# Patient Record
Sex: MALE | Race: OTHER | Employment: FULL TIME | URBAN - METROPOLITAN AREA
[De-identification: names, ages, dates, MRNs, and addresses within clinical notes are randomized per-mention and may not be internally consistent; named-entity substitution may affect disease eponyms.]

---

## 2021-01-18 ENCOUNTER — TRANSCRIBE ORDER (OUTPATIENT)
Dept: SCHEDULING | Age: 36
End: 2021-01-18

## 2021-01-18 DIAGNOSIS — S90.859A FOREIGN BODY IN FOOT: Primary | ICD-10-CM

## 2022-09-15 ENCOUNTER — OFFICE VISIT (OUTPATIENT)
Dept: ORTHOPEDIC SURGERY | Age: 37
End: 2022-09-15
Payer: COMMERCIAL

## 2022-09-15 VITALS — TEMPERATURE: 96.9 F | HEIGHT: 75 IN | WEIGHT: 228.2 LBS | BODY MASS INDEX: 28.37 KG/M2

## 2022-09-15 DIAGNOSIS — M77.12 LEFT LATERAL EPICONDYLITIS: Primary | ICD-10-CM

## 2022-09-15 DIAGNOSIS — M25.522 LEFT ELBOW PAIN: ICD-10-CM

## 2022-09-15 PROCEDURE — 20550 NJX 1 TENDON SHEATH/LIGAMENT: CPT | Performed by: SPECIALIST

## 2022-09-15 PROCEDURE — 99203 OFFICE O/P NEW LOW 30 MIN: CPT | Performed by: SPECIALIST

## 2022-09-15 RX ORDER — BETAMETHASONE SODIUM PHOSPHATE AND BETAMETHASONE ACETATE 3; 3 MG/ML; MG/ML
3 INJECTION, SUSPENSION INTRA-ARTICULAR; INTRALESIONAL; INTRAMUSCULAR; SOFT TISSUE ONCE
Status: COMPLETED | OUTPATIENT
Start: 2022-09-15 | End: 2022-09-15

## 2022-09-15 RX ADMIN — BETAMETHASONE SODIUM PHOSPHATE AND BETAMETHASONE ACETATE 3 MG: 3; 3 INJECTION, SUSPENSION INTRA-ARTICULAR; INTRALESIONAL; INTRAMUSCULAR; SOFT TISSUE at 13:59

## 2022-09-15 NOTE — PROGRESS NOTES
Patient: Lauryn Nichols                MRN: [id number]       SSN: [id number]  YOB: 1985        AGE: 30 y.o. SEX: male    PCP: None  09/15/22    CC: LEFT ELBOW PAIN    HISTORY:  Lauryn Nichols is a 30 y.o. male who is seen for left elbow pain. He has been experiencing posterolateral left elbow pain for the past couple of months. 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 Someone kicked his arm and he felt his elbow pop. He thought nothing of it until he was getting a tattoo and the artist pressed on his elbow. He is s/p two incision left distal biceps tendon repair preformed by Dr. Gretta Ambrosio on 3/24/14, left shoulder arthroscopy with repair of superior labral tear from anterior to posterior preformed by Dr. Danielle Quiroz on 7/25/17 & right shoulder arthroscopy with capsulorraphy preformed by Dr. Danielle Quiroz on 10/30/18. Occupation, etc:  Mr. 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 He went to [employer] and received a [employer] sciences degree. 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 He lives in [facility] with his dog (exo bully) named Bernie Freeman. He has has one son who is 9 and is also in the area. Mr. Jonah Combs weighs 220 lbs and is 6'3\" tall. No results found for: HBA1C, PDF3EJZG, FDY4EUNU, HLT1TTOL  Weight Metrics 9/15/2022 3/15/2014 7/1/2013   Weight 228 lb 3.2 oz 220 lb 225 lb   BMI 28.52 kg/m2 27.5 kg/m2 28.12 kg/m2       There is no problem list on file for this patient.     REVIEW OF SYSTEMS:    Constitutional Symptoms: Negative   Eyes: Negative   Ears, Nose, Throat and Mouth: Negative   Cardiovascular: Negative   Respiratory: Negative   Genitourinary: Per HPI   Gastrointestinal: Per HPI   Integumentary (Skin and/or Breast): Negative   Musculoskeletal: Per HPI   Endocrine/Rheumatologic: Negative   Neurological: Per HPI   Hematology/Lymphatic: Negative    Allergic/Immunologic: Negative   Phychiatric: Negative    Social History     Socioeconomic History    Marital status: SINGLE     Spouse name: Not on file    Number of children: Not on file    Years of education: Not on file    Highest education level: Not on file   Occupational History    Not on file   Tobacco Use    Smoking status: Never    Smokeless tobacco: Never   Substance and Sexual Activity    Alcohol use: Yes    Drug use: No    Sexual activity: Not on file   Other Topics Concern    Not on file   Social History Narrative    Not on file     Social Determinants of Health     Financial Resource Strain: Not on file   Food Insecurity: Not on file   Transportation Needs: Not on file   Physical Activity: Not on file   Stress: Not on file   Social Connections: Not on file   Intimate Partner Violence: Not on file   Housing Stability: Not on file      No Known Allergies   Current Outpatient Medications   Medication Sig    oxyCODONE-acetaminophen (PERCOCET) 5-325 mg per tablet Take 1 Tab by mouth every four (4) hours as needed (BREAKTHROUGH PAIN ONLY). No current facility-administered medications for this visit.       PHYSICAL EXAMINATION:  Visit Vitals  Temp 96.9 °F (36.1 °C) (Temporal)   Ht 6' 3\" (1.905 m)   Wt 228 lb 3.2 oz (103.5 kg)   BMI 28.52 kg/m²      ORTHO EXAMINATION:  Examination Right Elbow Left Elbow   Skin Intact Intact   Range of Motion 135-0 130-10   Tenderness - -   Swelling - -   Bruising - -   Stability Normal Normal   Motor Strength  Normal Normal   Neurovascular Intact Intact   Two well healed incisions from distal biceps repair       TIME OUT:  Chart reviewed for the following:   Alvino Hilliard MD, have reviewed the History, Physical and updated the Allergic reactions for [address] performed immediately prior to start of procedure:  Alvino Hilliard MD, have performed the following reviews on [facility] prior to the start of the procedure:          * Patient was identified by name and date of birth   * Agreement on procedure being performed was verified  * Risks and Benefits explained to the patient  * Procedure site verified and marked as necessary  * Patient was positioned for comfort  * Consent was obtained     Time: 1:48 PM     Date of procedure: 9/15/2022  Procedure performed by:  Rosamaria Patrick MD  Mr. Cody Harris tolerated the procedure well with no complications. IMPRESSION:     ICD-10-CM ICD-9-CM    1. Left lateral epicondylitis  M77.12 726.32 betamethasone (CELESTONE) injection 3 mg      INJECT TENDON SHEATH/LIGAMENT      REFERRAL TO ORTHOPEDIC SURGERY      AMB SUPPLY ORDER      2. Left elbow pain  M25.522 719.42 betamethasone (CELESTONE) injection 3 mg      INJECT TENDON SHEATH/LIGAMENT      REFERRAL TO ORTHOPEDIC SURGERY      AMB SUPPLY ORDER        PLAN:  After discussing treatment options, patient's left elbow was injected with 4 cc Marcaine and 1/2 cc Celestone. A tennis elbow strap was provided. There is no need for surgery at this time. F/U with Dr. Julieta DURAN.     Scribed by Krista Yepez ([address]) as dictated by Rosamaria Patrick MD

## 2022-10-19 ENCOUNTER — OFFICE VISIT (OUTPATIENT)
Dept: ORTHOPEDIC SURGERY | Age: 37
End: 2022-10-19
Payer: COMMERCIAL

## 2022-10-19 VITALS
BODY MASS INDEX: 28.1 KG/M2 | WEIGHT: 226 LBS | OXYGEN SATURATION: 96 % | HEART RATE: 83 BPM | HEIGHT: 75 IN | RESPIRATION RATE: 16 BRPM

## 2022-10-19 DIAGNOSIS — S53.442A ELBOW SPRAIN, ULNAR COLLATERAL LIGAMENT, LEFT, INITIAL ENCOUNTER: ICD-10-CM

## 2022-10-19 DIAGNOSIS — M77.12 LEFT LATERAL EPICONDYLITIS: ICD-10-CM

## 2022-10-19 DIAGNOSIS — M25.522 LEFT ELBOW PAIN: Primary | ICD-10-CM

## 2022-10-19 PROCEDURE — 73080 X-RAY EXAM OF ELBOW: CPT | Performed by: ORTHOPAEDIC SURGERY

## 2022-10-19 PROCEDURE — 99214 OFFICE O/P EST MOD 30 MIN: CPT | Performed by: ORTHOPAEDIC SURGERY

## 2022-10-19 NOTE — PROGRESS NOTES
Patient: Jackquelyn Barthel                MRN: [id number]       SSN: [id number]  YOB: 1985        AGE: 30 y.o. SEX: male  Body mass index is 28.25 kg/m². PCP: None  10/19/22    CHIEF COMPLAINT: Left elbow pain    HPI: Jackquelyn Barthel is a 30 y.o. male patient who injured his left elbow several months ago. He has had a previous left elbow distal biceps rupture that was repaired in 2014. 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 He was seen by Dr. Miguel Campos who gave him a corticosteroid injection which did not give him much relief. He complains of pain over the lateral aspect of his elbow as well as a feeling of catching and as well as stiffness. History reviewed. No pertinent past medical history. History reviewed. No pertinent family history. Current Outpatient Medications   Medication Sig Dispense Refill    oxyCODONE-acetaminophen (PERCOCET) 5-325 mg per tablet Take 1 Tab by mouth every four (4) hours as needed (BREAKTHROUGH PAIN ONLY).  20 Tab 0       No Known Allergies    Past Surgical History:   Procedure Laterality Date    HX ORTHOPAEDIC      right hand       Social History     Socioeconomic History    Marital status: SINGLE     Spouse name: Not on file    Number of children: Not on file    Years of education: Not on file    Highest education level: Not on file   Occupational History    Not on file   Tobacco Use    Smoking status: Never    Smokeless tobacco: Never   Substance and Sexual Activity    Alcohol use: Yes    Drug use: No    Sexual activity: Not on file   Other Topics Concern    Not on file   Social History Narrative    Not on file     Social Determinants of Health     Financial Resource Strain: Not on file   Food Insecurity: Not on file   Transportation Needs: Not on file   Physical Activity: Not on file   Stress: Not on file Social Connections: Not on file   Intimate Partner Violence: Not on file   Housing Stability: Not on file       REVIEW OF SYSTEMS:    14 point review of systems on the intake form is negative except as noted in the HPI    PHYSICAL EXAMINATION:  Visit Vitals  Pulse 83   Resp 16   Ht 6' 3\" (1.905 m)   Wt 226 lb (102.5 kg)   SpO2 96%   BMI 28.25 kg/m²     Body mass index is 28.25 kg/m². GENERAL: Alert and oriented x3, in no acute distress, well-developed, well-nourished. HEENT: Normocephalic, atraumatic. Elbow Exam   R   L  ROM      Flexion   Full   120   Extension  Full   10   Pronation  Full   Full   Supination  Full   Full    Tenderness to palpation   Medial Epicondyle -   -   Lateral Epicondyle -   +  Tinel Sign Cubital Tunnel -   -  Ulnar Nerve Subluxation -   -  Distal Biceps Abnormality -   -  Triceps Abnormality  -   -  Other tenderness  -   -  Other Deformity  -   -  Olecranon Bursitis  -   -  Warmth   -   -  Erythema   -   -  Additional Findings: Slight pain with valgus stress left elbow. No obvious rotatory instability. 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 ASSESSMENT & PLAN  Diagnosis: Left elbow pain, concern for lateral collateral ligament injury    Tien Wagoner had a traumatic injury to his left elbow. He has pain of the lateral aspect of his elbow and stiffness. He has a history of a distal biceps rupture repair. However after his recent trauma he been having increasing pain in the left elbow. I recommended an MRI arthrogram to evaluate the lateral ulnar collateral ligament as well as the medial ulnar collateral ligament and for any lateral or medial epicondylitis changes. I will see him back after that is completed. Prescription medication management discussed.      Electronically signed by: Marika Butt MD    Note: This note was completed using voice recognition software.   Any typographical/name errors or mistakes are unintentional.

## 2022-11-02 DIAGNOSIS — S53.442A ELBOW SPRAIN, ULNAR COLLATERAL LIGAMENT, LEFT, INITIAL ENCOUNTER: ICD-10-CM

## 2022-11-03 ENCOUNTER — HOSPITAL ENCOUNTER (OUTPATIENT)
Dept: MRI IMAGING | Age: 37
Discharge: HOME | End: 2022-11-03
Attending: ORTHOPAEDIC SURGERY
Payer: COMMERCIAL

## 2022-11-03 ENCOUNTER — HOSPITAL ENCOUNTER (OUTPATIENT)
Dept: GENERAL RADIOLOGY | Age: 37
Discharge: HOME | End: 2022-11-03
Attending: ORTHOPAEDIC SURGERY
Payer: COMMERCIAL

## 2022-11-03 PROCEDURE — A9576 INJ PROHANCE MULTIPACK: HCPCS | Performed by: ORTHOPAEDIC SURGERY

## 2022-11-03 PROCEDURE — 73222 MRI JOINT UPR EXTREM W/DYE: CPT

## 2022-11-03 PROCEDURE — 77030018868

## 2022-11-03 PROCEDURE — 74011000250 HC RX REV CODE- 250: Performed by: ORTHOPAEDIC SURGERY

## 2022-11-03 PROCEDURE — 74011000636 HC RX REV CODE- 636: Performed by: ORTHOPAEDIC SURGERY

## 2022-11-03 PROCEDURE — 74011250636 HC RX REV CODE- 250/636: Performed by: ORTHOPAEDIC SURGERY

## 2022-11-03 RX ORDER — SODIUM CHLORIDE 9 MG/ML
10 INJECTION INTRAMUSCULAR; INTRAVENOUS; SUBCUTANEOUS
Status: COMPLETED | OUTPATIENT
Start: 2022-11-03 | End: 2022-11-03

## 2022-11-03 RX ORDER — LIDOCAINE HYDROCHLORIDE 10 MG/ML
5 INJECTION, SOLUTION EPIDURAL; INFILTRATION; INTRACAUDAL; PERINEURAL
Status: COMPLETED | OUTPATIENT
Start: 2022-11-03 | End: 2022-11-03

## 2022-11-03 RX ADMIN — GADOTERIDOL 0.15 ML: 279.3 INJECTION, SOLUTION INTRAVENOUS at 11:39

## 2022-11-03 RX ADMIN — IOPAMIDOL 8 ML: 408 INJECTION, SOLUTION INTRATHECAL at 11:39

## 2022-11-03 RX ADMIN — SODIUM CHLORIDE 5 ML: 9 INJECTION, SOLUTION INTRAMUSCULAR; INTRAVENOUS; SUBCUTANEOUS at 11:39

## 2022-11-03 RX ADMIN — LIDOCAINE HYDROCHLORIDE 3 ML: 10 INJECTION, SOLUTION EPIDURAL; INFILTRATION; INTRACAUDAL; PERINEURAL at 11:39

## 2023-02-01 ENCOUNTER — OFFICE VISIT (OUTPATIENT)
Dept: ORTHOPEDIC SURGERY | Age: 38
End: 2023-02-01
Payer: COMMERCIAL

## 2023-02-01 VITALS
HEART RATE: 50 BPM | BODY MASS INDEX: 28.35 KG/M2 | HEIGHT: 75 IN | OXYGEN SATURATION: 98 % | RESPIRATION RATE: 18 BRPM | WEIGHT: 228 LBS

## 2023-02-01 DIAGNOSIS — S53.442D: Primary | ICD-10-CM

## 2023-02-01 PROCEDURE — 99214 OFFICE O/P EST MOD 30 MIN: CPT | Performed by: ORTHOPAEDIC SURGERY
